# Patient Record
Sex: MALE | Race: WHITE | URBAN - METROPOLITAN AREA
[De-identification: names, ages, dates, MRNs, and addresses within clinical notes are randomized per-mention and may not be internally consistent; named-entity substitution may affect disease eponyms.]

---

## 2023-11-09 ENCOUNTER — HOME HEALTH ADMISSION (OUTPATIENT)
Dept: HOME HEALTH SERVICES | Facility: HOME HEALTH | Age: 73
End: 2023-11-09
Payer: MEDICARE

## 2023-11-10 ENCOUNTER — HOME CARE VISIT (OUTPATIENT)
Dept: HOME HEALTH SERVICES | Facility: HOME HEALTH | Age: 73
End: 2023-11-10
Payer: MEDICARE

## 2023-11-10 ENCOUNTER — HOME CARE VISIT (OUTPATIENT)
Dept: SCHEDULING | Facility: HOME HEALTH | Age: 73
End: 2023-11-10
Payer: MEDICARE

## 2023-11-10 VITALS
DIASTOLIC BLOOD PRESSURE: 60 MMHG | HEART RATE: 88 BPM | TEMPERATURE: 97.8 F | SYSTOLIC BLOOD PRESSURE: 90 MMHG | RESPIRATION RATE: 17 BRPM | OXYGEN SATURATION: 98 %

## 2023-11-10 PROCEDURE — G0152 HHCP-SERV OF OT,EA 15 MIN: HCPCS

## 2023-11-10 PROCEDURE — G0151 HHCP-SERV OF PT,EA 15 MIN: HCPCS

## 2023-11-10 PROCEDURE — G0299 HHS/HOSPICE OF RN EA 15 MIN: HCPCS

## 2023-11-10 ASSESSMENT — ENCOUNTER SYMPTOMS: STOOL DESCRIPTION: LOOSE

## 2023-11-10 NOTE — HOME HEALTH
Problem:  Traumatic wound to Left FA, New A-Fib, HTN, CKD, Cirrhosis of Liver, H/O ETOH abuse, HLP, Hypotension, Fall risk    Intervention: Pt was seen today for admit to home care following hospitalization/TCU for alcohol withdrawal, confusion syncope. He is tony pleasant with writer for assessment, Autumn[employer]. He lives alone in an apartment that has elevator on floor so stairs are not required unless for emergency. He is able to make his own needs made. He is currently reporting that he is only ambulating with manual WC d/t orthostatic hypotention he has been experiencing. He has been rx Midodrine however he has reported that he ran out of medications today. He is also missing Metoprolol and his Levothyroxine is the wrong dose. He has also reported that his PMD is no longer accepting his insurance so he will no longer follow him. Writer has reached out to [employer] who  has agreed to follow the pt she has been emailed the names and doses of all missing medications and she will call in enough to get him though the weekend till she can see him next week. [phone]. Writer has done teaching on importance of good hydration, slow to rise from laying/sitting to stand to prevent falls. His sitting BP was 90/60. Standing 88/56. He denied any dizziness, NV, HA. He has taught back that he always keeps walker and wc clsoe to him at all times. He denies pain or SOB. None was observed. HRRR. Flecainide was ordered for irregular heartbeat and that is not avaiable and he does not want to take it anyway. Abd soft nontender. BS X 4. He did have diarrhea this morning loose stool was found all over bedroom floor and BR. He is waiting for housekeeping. Advised him to take Immodium which has reported he already has. No pedal edema. Homans Neg. Pedal pulses present. Traumatic wound to left FA. measures approx 2.5 X.3.2 X 0.1, sct brown drainage noted wound bed beefy red, no s/sx of infection , wound care per MD order.  During

## 2023-11-13 ENCOUNTER — HOME CARE VISIT (OUTPATIENT)
Dept: SCHEDULING | Facility: HOME HEALTH | Age: 73
End: 2023-11-13
Payer: MEDICARE

## 2023-11-13 ENCOUNTER — HOME CARE VISIT (OUTPATIENT)
Dept: HOME HEALTH SERVICES | Facility: HOME HEALTH | Age: 73
End: 2023-11-13
Payer: MEDICARE

## 2023-11-13 PROCEDURE — G0300 HHS/HOSPICE OF LPN EA 15 MIN: HCPCS

## 2023-11-13 PROCEDURE — G0151 HHCP-SERV OF PT,EA 15 MIN: HCPCS

## 2023-11-14 ENCOUNTER — HOME CARE VISIT (OUTPATIENT)
Dept: HOME HEALTH SERVICES | Facility: HOME HEALTH | Age: 73
End: 2023-11-14
Payer: MEDICARE

## 2023-11-14 VITALS
HEART RATE: 85 BPM | SYSTOLIC BLOOD PRESSURE: 91 MMHG | RESPIRATION RATE: 19 BRPM | TEMPERATURE: 97.1 F | DIASTOLIC BLOOD PRESSURE: 76 MMHG

## 2023-11-14 PROCEDURE — G0152 HHCP-SERV OF OT,EA 15 MIN: HCPCS

## 2023-11-15 ENCOUNTER — HOME CARE VISIT (OUTPATIENT)
Dept: HOME HEALTH SERVICES | Facility: HOME HEALTH | Age: 73
End: 2023-11-15
Payer: MEDICARE

## 2023-11-15 VITALS
DIASTOLIC BLOOD PRESSURE: 68 MMHG | RESPIRATION RATE: 18 BRPM | TEMPERATURE: 97.8 F | SYSTOLIC BLOOD PRESSURE: 91 MMHG | SYSTOLIC BLOOD PRESSURE: 101 MMHG | RESPIRATION RATE: 16 BRPM | HEART RATE: 85 BPM | DIASTOLIC BLOOD PRESSURE: 76 MMHG | HEART RATE: 68 BPM

## 2023-11-15 PROCEDURE — G0157 HHC PT ASSISTANT EA 15: HCPCS

## 2023-11-16 ENCOUNTER — HOME CARE VISIT (OUTPATIENT)
Dept: HOME HEALTH SERVICES | Facility: HOME HEALTH | Age: 73
End: 2023-11-16
Payer: MEDICARE

## 2023-11-16 PROCEDURE — G0152 HHCP-SERV OF OT,EA 15 MIN: HCPCS

## 2023-11-17 ENCOUNTER — HOME CARE VISIT (OUTPATIENT)
Dept: SCHEDULING | Facility: HOME HEALTH | Age: 73
End: 2023-11-17
Payer: MEDICARE

## 2023-11-17 PROCEDURE — G0300 HHS/HOSPICE OF LPN EA 15 MIN: HCPCS

## 2023-11-19 VITALS
HEART RATE: 60 BPM | RESPIRATION RATE: 18 BRPM | OXYGEN SATURATION: 99 % | RESPIRATION RATE: 18 BRPM | SYSTOLIC BLOOD PRESSURE: 122 MMHG | TEMPERATURE: 97.5 F | SYSTOLIC BLOOD PRESSURE: 152 MMHG | DIASTOLIC BLOOD PRESSURE: 68 MMHG | HEART RATE: 67 BPM | DIASTOLIC BLOOD PRESSURE: 74 MMHG | TEMPERATURE: 97.3 F | OXYGEN SATURATION: 98 %

## 2023-11-20 ENCOUNTER — HOME CARE VISIT (OUTPATIENT)
Dept: HOME HEALTH SERVICES | Facility: HOME HEALTH | Age: 73
End: 2023-11-20
Payer: MEDICARE

## 2023-11-20 PROCEDURE — G0151 HHCP-SERV OF PT,EA 15 MIN: HCPCS

## 2023-11-21 ENCOUNTER — HOME CARE VISIT (OUTPATIENT)
Dept: SCHEDULING | Facility: HOME HEALTH | Age: 73
End: 2023-11-21
Payer: MEDICARE

## 2023-11-21 ENCOUNTER — HOME CARE VISIT (OUTPATIENT)
Dept: HOME HEALTH SERVICES | Facility: HOME HEALTH | Age: 73
End: 2023-11-21
Payer: MEDICARE

## 2023-11-21 VITALS
TEMPERATURE: 96.8 F | RESPIRATION RATE: 18 BRPM | HEART RATE: 82 BPM | OXYGEN SATURATION: 99 % | HEART RATE: 71 BPM | SYSTOLIC BLOOD PRESSURE: 114 MMHG | TEMPERATURE: 96.8 F | DIASTOLIC BLOOD PRESSURE: 57 MMHG | HEART RATE: 62 BPM | SYSTOLIC BLOOD PRESSURE: 93 MMHG | SYSTOLIC BLOOD PRESSURE: 83 MMHG | DIASTOLIC BLOOD PRESSURE: 56 MMHG | DIASTOLIC BLOOD PRESSURE: 47 MMHG | RESPIRATION RATE: 16 BRPM

## 2023-11-21 PROCEDURE — G0299 HHS/HOSPICE OF RN EA 15 MIN: HCPCS

## 2023-11-21 PROCEDURE — G0152 HHCP-SERV OF OT,EA 15 MIN: HCPCS

## 2023-11-23 NOTE — HOME HEALTH
Patient with Traumatic wound to Left FA, New A-Fib, HTN, CKD, Cirrhosis of Liver, H/O ETOH abuse, HLP, Hypotension, Fall risk   Wound Care Provided per care plan. Pt tolerated well. Caregiver involvement:  patient lives in [address] [place] [address] with 24/7 assistance available  Medications reconciled and all medications are available in home. Home health supplies by type and quantity ordered/delivered this visit include:n/a  Patient education provided this visit: SN educated patient and patient's caregiver on proper hydration/ nutrition for wound healing. Patient and patient caregiver verbalizes understanding via the teach back method. Progress toward goals: progressing well  Home exercise program:   Plan for next visit: wound care  The following discharge planning was discussed with the patient/ patient's caregiver: DC when goals met.

## 2023-11-23 NOTE — HOME HEALTH
Patient with Traumatic wound to Left FA, New A-Fib, HTN, CKD, Cirrhosis of Liver, H/O ETOH abuse, HLP, Hypotension, Fall risk   Wound Care Provided per care plan. Pt tolerated well. Caregiver involvement:  patient lives in [address] [place] [address] with 24/7 assistance available  Medications reconciled and all medications are available in home. Home health supplies by type and quantity ordered/delivered this visit include:n/a  Patient education provided this visit: SN educated patient and patient's caregiver on fall prevention/safety. Patient and patient caregiver verbalizes understanding via the teach back method. Progress toward goals: progressing well  Home exercise program:   Plan for next visit: wound care  The following discharge planning was discussed with the patient/ patient's caregiver: DC when goals met.

## 2023-11-24 ENCOUNTER — HOME CARE VISIT (OUTPATIENT)
Dept: SCHEDULING | Facility: HOME HEALTH | Age: 73
End: 2023-11-24
Payer: MEDICARE

## 2023-11-24 PROCEDURE — G0299 HHS/HOSPICE OF RN EA 15 MIN: HCPCS

## 2023-11-27 ENCOUNTER — HOME CARE VISIT (OUTPATIENT)
Dept: HOME HEALTH SERVICES | Facility: HOME HEALTH | Age: 73
End: 2023-11-27
Payer: MEDICARE

## 2023-11-27 VITALS
DIASTOLIC BLOOD PRESSURE: 60 MMHG | RESPIRATION RATE: 18 BRPM | SYSTOLIC BLOOD PRESSURE: 110 MMHG | TEMPERATURE: 96.7 F | OXYGEN SATURATION: 98 % | HEART RATE: 77 BPM

## 2023-11-27 PROCEDURE — G0299 HHS/HOSPICE OF RN EA 15 MIN: HCPCS

## 2023-11-27 ASSESSMENT — ENCOUNTER SYMPTOMS
STOOL DESCRIPTION: FORMED
DYSPNEA ACTIVITY LEVEL: AFTER AMBULATING MORE THAN 20 FT

## 2023-11-27 NOTE — HOME HEALTH
Problem:  Traumatic wound to Left FA, New A-Fib, HTN, CKD, Cirrhosis of Liver, H/O ETOH abuse, HLP, Hypotension, Fall risk       Intervention: Old dressing removed fro left FA wound, cleansed with wound cleanser lighty patted dry, skin prep solomon-wound, Medihoney to wound bed, covered with foam adhesive dressing. No s/sx of infection. Pt tolerated without complications or concerns. Writer so no evidence of ETOH abuse, however he did report having a few drinks over the weekend while he was watching football. Writer did teaching on importance of refrain from this to prevent further health complications or falls. Teach back by pt performed. His BP has been staying slightly more above what he has been experiencing. Goals; BP will stablize. Wound will heal without complications.

## 2023-11-28 VITALS
OXYGEN SATURATION: 98 % | SYSTOLIC BLOOD PRESSURE: 122 MMHG | TEMPERATURE: 98.2 F | RESPIRATION RATE: 18 BRPM | DIASTOLIC BLOOD PRESSURE: 60 MMHG | HEART RATE: 62 BPM

## 2023-11-28 VITALS
RESPIRATION RATE: 16 BRPM | TEMPERATURE: 96.9 F | SYSTOLIC BLOOD PRESSURE: 126 MMHG | DIASTOLIC BLOOD PRESSURE: 68 MMHG | RESPIRATION RATE: 18 BRPM | DIASTOLIC BLOOD PRESSURE: 77 MMHG | SYSTOLIC BLOOD PRESSURE: 118 MMHG | HEART RATE: 73 BPM | HEART RATE: 69 BPM | TEMPERATURE: 98.1 F

## 2023-11-28 ASSESSMENT — ENCOUNTER SYMPTOMS: STOOL DESCRIPTION: FORMED

## 2023-11-28 NOTE — HOME HEALTH
Problem:  Traumatic wound to Left FA, New A-Fib, HTN, CKD, Cirrhosis of Liver, H/O ETOH abuse, HLP, Hypotension, Fall risk      Intervention: Old dressing removed fro left FA wound, cleansed with wound cleanser lighty patted dry, skin prep solomon-wound, Medihoney to wound bed, covered with foam adhesive dressing. No s/sx of infection. Pt tolerated without complications or concerns. Writer so no evidence of ETOH abuse, however he did report having a few drinks over the weekend while he was watching football. Writer did teaching on importance of refrain from this to prevent further health complications or falls. Teach back by pt performed. His BP has been staying slightly more above what he has been experiencing. Goals; BP will stablize.  Wound will heal without complications

## 2023-11-30 VITALS
HEART RATE: 78 BPM | TEMPERATURE: 98.2 F | SYSTOLIC BLOOD PRESSURE: 120 MMHG | OXYGEN SATURATION: 98 % | DIASTOLIC BLOOD PRESSURE: 70 MMHG | RESPIRATION RATE: 18 BRPM

## 2023-11-30 ASSESSMENT — ENCOUNTER SYMPTOMS: STOOL DESCRIPTION: FORMED
